# Patient Record
Sex: MALE | URBAN - METROPOLITAN AREA
[De-identification: names, ages, dates, MRNs, and addresses within clinical notes are randomized per-mention and may not be internally consistent; named-entity substitution may affect disease eponyms.]

---

## 2021-02-23 ENCOUNTER — NURSE TRIAGE (OUTPATIENT)
Dept: OTHER | Facility: OTHER | Age: 41
End: 2021-02-23

## 2021-02-23 DIAGNOSIS — Z20.828 SARS-ASSOCIATED CORONAVIRUS EXPOSURE: Primary | ICD-10-CM

## 2021-02-23 DIAGNOSIS — Z20.828 SARS-ASSOCIATED CORONAVIRUS EXPOSURE: ICD-10-CM

## 2021-02-23 PROCEDURE — U0005 INFEC AGEN DETEC AMPLI PROBE: HCPCS | Performed by: FAMILY MEDICINE

## 2021-02-23 PROCEDURE — U0003 INFECTIOUS AGENT DETECTION BY NUCLEIC ACID (DNA OR RNA); SEVERE ACUTE RESPIRATORY SYNDROME CORONAVIRUS 2 (SARS-COV-2) (CORONAVIRUS DISEASE [COVID-19]), AMPLIFIED PROBE TECHNIQUE, MAKING USE OF HIGH THROUGHPUT TECHNOLOGIES AS DESCRIBED BY CMS-2020-01-R: HCPCS | Performed by: FAMILY MEDICINE

## 2021-02-23 NOTE — TELEPHONE ENCOUNTER
Reason for Disposition   [1] COVID-19 diagnosed by positive lab test AND [2] mild symptoms (e g , cough, fever, others) AND [2] no complications or SOB    Additional Information   [1] Symptoms of COVID-19 (e g , cough, fever, SOB, or others) AND [2] within 14 days of EXPOSURE (close contact) with diagnosed or suspected COVID-19 patient    Protocols used: CORONAVIRUS (COVID-19) DIAGNOSED OR SUSPECTED-ADULT-[place], CORONAVIRUS (COVID-19) EXPOSURE-ADULT-[place]

## 2021-02-23 NOTE — TELEPHONE ENCOUNTER
Regarding: Covid-19 (Exposed) - Symptomatic (Dry Cough)  ----- Message from Leticia Quesada sent at 2/23/2021 12:50 PM EST -----  "I was exposed last week twice and I have a headache, dry cough, sore throat and body aches " No PCP  1  Were you within 6 feet or less, for up to 15 minutes or more with a person that has a confirmed COVID-19 test? yes  2  What was the date of your exposure? 2/15 and 2/16, as well as 2/18 and 2/19  3  Are you experiencing any symptoms attributed to the virus?  (Assess for SOB, cough, fever, difficulty breathing) headache, dry cough, sore throat and body aches  4  HIGH RISK: Do you have any history heart or lung conditions, weakened immune system, diabetes, Asthma, CHF, HIV, COPD, Chemo, renal failure, sickle cell, etc? no  5   PREGNANCY: Are you pregnant or did you recently give birth? n/a

## 2021-02-24 ENCOUNTER — TELEPHONE (OUTPATIENT)
Dept: OTHER | Facility: OTHER | Age: 41
End: 2021-02-24

## 2021-02-24 LAB — SARS-COV-2 RNA RESP QL NAA+PROBE: POSITIVE

## 2021-02-24 NOTE — RESULT ENCOUNTER NOTE
I spoke with Aroldo Becerril and let him know that his COVID-19 swab was positive  Continue symptomatic treatment  Advised he implement home isolation measures including:    Staying home  Stay in a specific "sick room" or area and away from other people or animals, including pets  Wear a mask when leaving your room  Use a separate bathroom, if available  Wipe down all commonly touched surfaces with household   Please schedule follow up visit with your PCP, Dr Griffin Grayson  If you have any other questions or need assistance making a virtual appointment with a [facility] provider, please call us back at [phone], Option 7 and someone can assist you

## 2021-02-24 NOTE — TELEPHONE ENCOUNTER
Your test for the novel coronavirus, also known as COVID-19, was positive  The sample showed that the virus was present  Positive COVID-19 test results are reportable to the [place] Department of Health  You may receive a call from trained public health staff to conduct an interview  It is important to answer their call  They will ask you to verify who you are  During the call they will ask you about what symptoms you have, what you did before you got sick, and who you were close to while sick  The health department does this to make sure everyone stays healthy and to reduce the spread of the virus  If you would like to verify if the caller does in fact work in contact tracing, call the [address] [place] [address] at [employer] ([phone])  For additional information, please visit the [employer] website: [email or web address]     If you have any additional questions, we can schedule a virtual visit for you with a provider or call the [facility] [phone], option 7, for care advice    For additional information, please visit the Coronavirus FAQ on the [facility] home page ([email or web address])